# Patient Record
Sex: MALE | Race: WHITE | Employment: OTHER | URBAN - METROPOLITAN AREA
[De-identification: names, ages, dates, MRNs, and addresses within clinical notes are randomized per-mention and may not be internally consistent; named-entity substitution may affect disease eponyms.]

---

## 2018-06-15 ENCOUNTER — OFFICE VISIT (OUTPATIENT)
Dept: URGENT CARE | Facility: RETAIL CLINIC | Age: 48
End: 2018-06-15
Payer: COMMERCIAL

## 2018-06-15 VITALS
DIASTOLIC BLOOD PRESSURE: 59 MMHG | TEMPERATURE: 98.4 F | OXYGEN SATURATION: 97 % | SYSTOLIC BLOOD PRESSURE: 102 MMHG | HEART RATE: 61 BPM

## 2018-06-15 DIAGNOSIS — R09.81 NASAL CONGESTION: ICD-10-CM

## 2018-06-15 DIAGNOSIS — J31.0 RHINITIS, PURULENT, CHRONIC: ICD-10-CM

## 2018-06-15 DIAGNOSIS — Z72.0 TOBACCO ABUSE DISORDER: ICD-10-CM

## 2018-06-15 DIAGNOSIS — R05.9 COUGH: Primary | ICD-10-CM

## 2018-06-15 DIAGNOSIS — R05.8 PRODUCTIVE COUGH: ICD-10-CM

## 2018-06-15 PROCEDURE — 99203 OFFICE O/P NEW LOW 30 MIN: CPT | Performed by: PHYSICIAN ASSISTANT

## 2018-06-15 RX ORDER — AMLODIPINE BESYLATE 5 MG/1
TABLET ORAL
Refills: 0 | COMMUNITY
Start: 2018-06-10

## 2018-06-15 RX ORDER — METOPROLOL SUCCINATE 100 MG/1
TABLET, EXTENDED RELEASE ORAL
Refills: 6 | COMMUNITY
Start: 2018-06-10

## 2018-06-15 RX ORDER — CARVEDILOL 25 MG/1
TABLET ORAL
Refills: 0 | COMMUNITY
Start: 2018-06-10

## 2018-06-15 RX ORDER — LISINOPRIL 40 MG/1
TABLET ORAL
Refills: 1 | COMMUNITY
Start: 2018-06-10

## 2018-06-15 RX ORDER — HYDROCHLOROTHIAZIDE 25 MG/1
TABLET ORAL
Refills: 1 | COMMUNITY
Start: 2018-06-10

## 2018-06-15 RX ORDER — FLUTICASONE PROPIONATE 50 MCG
1-2 SPRAY, SUSPENSION (ML) NASAL DAILY
Qty: 1 BOTTLE | Refills: 1 | Status: SHIPPED | OUTPATIENT
Start: 2018-06-15

## 2018-06-15 RX ORDER — NITROGLYCERIN 0.4 MG/1
TABLET SUBLINGUAL
Refills: 3 | COMMUNITY
Start: 2018-01-15

## 2018-06-15 RX ORDER — FLUOXETINE 10 MG/1
CAPSULE ORAL
Refills: 0 | COMMUNITY
Start: 2018-06-10

## 2018-06-15 RX ORDER — ALBUTEROL SULFATE 90 UG/1
AEROSOL, METERED RESPIRATORY (INHALATION)
Refills: 0 | COMMUNITY
Start: 2018-05-08

## 2018-06-15 RX ORDER — DOXYCYCLINE HYCLATE 100 MG
100 TABLET ORAL 2 TIMES DAILY
Qty: 28 TABLET | Refills: 0 | Status: SHIPPED | OUTPATIENT
Start: 2018-06-15

## 2018-06-15 RX ORDER — ALBUTEROL SULFATE 90 UG/1
2 AEROSOL, METERED RESPIRATORY (INHALATION) EVERY 6 HOURS PRN
Qty: 1 INHALER | Refills: 1 | Status: SHIPPED | OUTPATIENT
Start: 2018-06-15

## 2018-06-15 RX ORDER — ATORVASTATIN CALCIUM 40 MG/1
TABLET, FILM COATED ORAL
Refills: 0 | COMMUNITY
Start: 2018-06-10

## 2018-06-15 NOTE — MR AVS SNAPSHOT
After Visit Summary   6/15/2018    Raymond Sue    MRN: [id number]           Patient Information     Date Of Birth          1970        Visit Information        Provider Department      6/15/2018 12:40 PM Joya Newman PA-C [facility]        Today's Diagnoses     Cough    -  1    Nasal congestion        Tobacco abuse disorder          Care Instructions      Please FOLLOW UP at primary care clinic if not improving, new symptoms, worse or this does not resolve.            Follow-ups after your visit        Who to contact     You can reach your care team any time of the day by calling [phone].  Notification of test results:  If you have an abnormal lab result, we will notify you by phone as soon as possible.         Additional Information About Your Visit        Care EveryWhere ID     This is your Care EveryWhere ID. This could be used by other organizations to access your [place] medical records  [id number]        Your Vitals Were     Pulse Temperature Pulse Oximetry             61 98.4  F (36.9  C) (Temporal) 97%          Blood Pressure from Last 3 Encounters:   06/15/18 102/59    Weight from Last 3 Encounters:   No data found for Wt              Today, you had the following     No orders found for display         Today's Medication Changes          These changes are accurate as of 6/15/18 12:56 PM.  If you have any questions, ask your nurse or doctor.               Start taking these medicines.        Dose/Directions    fluticasone 50 MCG/ACT spray   Commonly known as:  FLONASE   Used for:  Nasal congestion   Started by:  Joya Newman PA-C        Dose:  1-2 spray   Spray 1-2 sprays into both nostrils daily   Quantity:  1 Bottle   Refills:  1         These medicines have changed or have updated prescriptions.        Dose/Directions    * VENTOLIN  (90 Base) MCG/ACT Inhaler   This may have changed:  Another medication with the same name was added.  Make sure you understand how and when to take each.   Generic drug:  albuterol   Changed by:  Joya Newman PA-C        INL 2 PUFFS QID PRN   Refills:  0       * albuterol 108 (90 Base) MCG/ACT Inhaler   Commonly known as:  PROAIR HFA/PROVENTIL HFA/VENTOLIN HFA   This may have changed:  You were already taking a medication with the same name, and this prescription was added. Make sure you understand how and when to take each.   Used for:  Cough   Changed by:  Joya Newman PA-C        Dose:  2 puff   Inhale 2 puffs into the lungs every 6 hours as needed for shortness of breath / dyspnea or wheezing   Quantity:  1 Inhaler   Refills:  1       * Notice:  This list has 2 medication(s) that are the same as other medications prescribed for you. Read the directions carefully, and ask your doctor or other care provider to review them with you.         Where to get your medicines      These medications were sent to [facility] [place] [address] [facility] [address] [facility] [address]  [address] [facility], [facility] [address]     Phone:  [phone]     albuterol 108 (90 Base) MCG/ACT Inhaler    fluticasone 50 MCG/ACT spray                Primary Care Provider Fax #    Physician No Ref-Primary [phone]       No address on file        Equal Access to Services     JENA SR : Hadii aad ku hadasho Soshreyaali, waaxda luqadaha, qaybta kaalmada adeannyaamaris, xiomara anderson. So [facility] [phone].    ATENCIÓN: Si habla español, tiene a arteaga disposición servicios gratuitos de asistencia lingüística. Llame al [phone].    We comply with applicable federal civil rights laws and [place] laws. We do not discriminate on the basis of race, color, national origin, age, disability, sex, sexual orientation, or gender identity.            Thank you!     Thank you for choosing [facility]  for your care. Our goal is always to provide you with excellent care.  Hearing back from our patients is one way we can continue to improve our services. Please take a few minutes to complete the written survey that you may receive in the mail after your visit with us. Thank you!             Your Updated Medication List - Protect others around you: Learn how to safely use, store and throw away your medicines at www.disposemymeds.org.          This list is accurate as of 6/15/18 12:56 PM.  Always use your most recent med list.                   Brand Name Dispense Instructions for use Diagnosis    amLODIPine 5 MG tablet    NORVASC     TK 1 T PO QD        atorvastatin 40 MG tablet    LIPITOR     TK 1 T PO QD IN THE YISEL        carvedilol 25 MG tablet    COREG     TK 1 T PO BID WC        FLUoxetine 10 MG capsule    PROzac     TK 1 C PO QAM        fluticasone 50 MCG/ACT spray    FLONASE    1 Bottle    Spray 1-2 sprays into both nostrils daily    Nasal congestion       hydrochlorothiazide 25 MG tablet    HYDRODIURIL     TK 1 T PO QD        lisinopril 40 MG tablet    PRINIVIL/ZESTRIL     TK 1 T PO QD        metoprolol succinate 100 MG 24 hr tablet    TOPROL-XL     TK ONE T PO QD        nitroGLYcerin 0.4 MG sublingual tablet    NITROSTAT     PLACE 1 T UNDER THE TONGUE Q 5 MINUTES IF NEEDED FOR CP        * VENTOLIN  (90 Base) MCG/ACT Inhaler   Generic drug:  albuterol      INL 2 PUFFS QID PRN        * albuterol 108 (90 Base) MCG/ACT Inhaler    PROAIR HFA/PROVENTIL HFA/VENTOLIN HFA    1 Inhaler    Inhale 2 puffs into the lungs every 6 hours as needed for shortness of breath / dyspnea or wheezing    Cough       * Notice:  This list has 2 medication(s) that are the same as other medications prescribed for you. Read the directions carefully, and ask your doctor or other care provider to review them with you.

## 2018-06-15 NOTE — PROGRESS NOTES
Chief Complaint   Patient presents with     Cough     productive cough x 2 days      Nasal Congestion     nasal congstion x 2 days      Generalized Body Aches     bodyaches x 2 days      Fatigue     feeling rundown and tired x 2 days         SUBJECTIVE:   Pt. presenting to [facility] -  with a chief complaint of suddne onset URI and cough syumptoms x 2 days. Possible low grade temp this am. OTC allergy med helped nasal congestion this am. Tired..   See CC.  Cough occ productive. Gets in coughing jags at times. Has not used Albuterol inhaler yet..No SOB or chest pain.   Hx of asthma n.  Onset of symptoms gradual  Course of illness is same.    Severity moderate  Current and Associated symptoms: feverish, runny nose, stuffy nose, cough - non-productive, cough - productive and fatigue  Treatment measures tried include OTC Cough med.  Predisposing factors include tobacco use.  Last antibiotic - 2 months  Z pack -  'flu'     Smoker yes    ROS:  Energy level is a nlittle <  ENT - denies ear pain, throat pain  CP - see above  GI- - appetite ok. No nausea, vomiting or diarrhea.   No bowel or bladder changes   MSK - no joint pain or swelling   Skin: No rashes    No past medical history on file.  No past surgical history on file.  There is no problem list on file for this patient.    Current Outpatient Prescriptions   Medication     amLODIPine (NORVASC) 5 MG tablet     atorvastatin (LIPITOR) 40 MG tablet     carvedilol (COREG) 25 MG tablet     FLUoxetine (PROZAC) 10 MG capsule     hydrochlorothiazide (HYDRODIURIL) 25 MG tablet     lisinopril (PRINIVIL/ZESTRIL) 40 MG tablet     metoprolol succinate (TOPROL-XL) 100 MG 24 hr tablet     nitroGLYcerin (NITROSTAT) 0.4 MG sublingual tablet     VENTOLIN  (90 Base) MCG/ACT Inhaler     No current facility-administered medications for this visit.        OBJECTIVE:  /59 (BP Location: Right arm, Patient Position: Chair, Cuff Size: Adult Large)  Pulse 61   "Temp 98.4  F (36.9  C) (Temporal)  SpO2 97%    GENERAL APPEARANCE: cooperative, alert and no distress. Appears well hydrated.  EYES: conjunctiva clear  HENT: Rt ear canal  Some soft ceruem and TM normal   Lt ear canal clear and TM normal   Nose mod congestion. Clear  discharge  Mouth without ulcers or lesions. no erythema. no exudate.   NECK: supple, few small shoddy NT ant nodes. No  posterior nodes.  RESP: - no rales or wheezes. Few scattered ronchi - clear with cough. Frequent dry coughBreathing easily.  CV: regular rates and rhythm  ABDOMEN:  soft, nontender, no HSM or masses and bowel sounds normal   SKIN: no suspicious lesions or rashes  no tenderness to palpate over  sinus areas.      ASSESSMENT:     Cough  Nasal congestion  Tobacco abuse disorder  Productive cough  Rhinitis, purulent, chronic      PLAN:  Symptomatic measures   Prescriptions as below. Discussed indications, dosing, side affects and adverse reactions of medications with  Patient -Flonase and Albuterol - fill Rx for Doxy if > 7-10 dfyas, 'worse', fever. Discussed \"watchful waiting\"  Eat yogurt daily or take a probiotic supplement when on antibiotics.  OTC cough suppressant/expectorant discussed  saline nasal spray for  nasal congestion   Cool mist vaporizer.  Smoking discouraged - Discussed > CV,CP and cancer risks with tobacco use.   Stay in clean air environment.  > rest.  > fluids.  Contagiousness and hygiene discussed.  Fever and pain  control measures discussed.   If unable to swallow or any breathing difficulty to go to ED   AVS given and discussed:  Patient Instructions     Please FOLLOW UP at primary care clinic if not improving, new symptoms, worse or this does not resolve.      Discussed CARE EVERYWHERE  Pt is comfortable with this plan.  Electronically signed,  TABITHA Clay      "

## 2018-08-04 ENCOUNTER — APPOINTMENT (OUTPATIENT)
Dept: GENERAL RADIOLOGY | Facility: CLINIC | Age: 48
End: 2018-08-04
Attending: PHYSICIAN ASSISTANT
Payer: COMMERCIAL

## 2018-08-04 ENCOUNTER — HOSPITAL ENCOUNTER (EMERGENCY)
Facility: CLINIC | Age: 48
Discharge: HOME | End: 2018-08-04
Attending: PHYSICIAN ASSISTANT | Admitting: PHYSICIAN ASSISTANT
Payer: COMMERCIAL

## 2018-08-04 VITALS
DIASTOLIC BLOOD PRESSURE: 90 MMHG | WEIGHT: 260 LBS | RESPIRATION RATE: 16 BRPM | SYSTOLIC BLOOD PRESSURE: 154 MMHG | TEMPERATURE: 98.3 F | OXYGEN SATURATION: 98 % | HEART RATE: 72 BPM

## 2018-08-04 DIAGNOSIS — E87.6 HYPOKALEMIA: ICD-10-CM

## 2018-08-04 DIAGNOSIS — R07.89 ATYPICAL CHEST PAIN: ICD-10-CM

## 2018-08-04 LAB
ALBUMIN SERPL-MCNC: 3.5 G/DL (ref 3.4–5)
ALP SERPL-CCNC: 80 U/L (ref 40–150)
ALT SERPL W P-5'-P-CCNC: 27 U/L (ref 0–70)
ANION GAP SERPL CALCULATED.3IONS-SCNC: 8 MMOL/L (ref 3–14)
AST SERPL W P-5'-P-CCNC: 13 U/L (ref 0–45)
BASOPHILS # BLD AUTO: 0.1 10E9/L (ref 0–0.2)
BASOPHILS NFR BLD AUTO: 0.6 %
BILIRUB SERPL-MCNC: 0.3 MG/DL (ref 0.2–1.3)
BUN SERPL-MCNC: 12 MG/DL (ref 7–30)
CALCIUM SERPL-MCNC: 8.2 MG/DL (ref 8.5–10.1)
CHLORIDE SERPL-SCNC: 109 MMOL/L (ref 94–109)
CO2 SERPL-SCNC: 27 MMOL/L (ref 20–32)
CREAT SERPL-MCNC: 0.96 MG/DL (ref 0.66–1.25)
D DIMER PPP FEU-MCNC: 0.3 UG/ML FEU (ref 0–0.5)
DEPRECATED S PYO AG THROAT QL EIA: NORMAL
DIFFERENTIAL METHOD BLD: NORMAL
EOSINOPHIL NFR BLD AUTO: 3.3 %
ERYTHROCYTE [DISTWIDTH] IN BLOOD BY AUTOMATED COUNT: 12.1 % (ref 10–15)
GFR SERPL CREATININE-BSD FRML MDRD: 84 ML/MIN/1.7M2
GLUCOSE SERPL-MCNC: 122 MG/DL (ref 70–99)
HCT VFR BLD AUTO: 41.7 % (ref 40–53)
HGB BLD-MCNC: 14.1 G/DL (ref 13.3–17.7)
IMM GRANULOCYTES # BLD: 0 10E9/L (ref 0–0.4)
IMM GRANULOCYTES NFR BLD: 0.2 %
LIPASE SERPL-CCNC: 158 U/L (ref 73–393)
LYMPHOCYTES # BLD AUTO: 2.1 10E9/L (ref 0.8–5.3)
LYMPHOCYTES NFR BLD AUTO: 23.8 %
MCH RBC QN AUTO: 30.9 PG (ref 26.5–33)
MCHC RBC AUTO-ENTMCNC: 33.8 G/DL (ref 31.5–36.5)
MCV RBC AUTO: 91 FL (ref 78–100)
MONOCYTES # BLD AUTO: 0.7 10E9/L (ref 0–1.3)
MONOCYTES NFR BLD AUTO: 7.6 %
NEUTROPHILS # BLD AUTO: 5.6 10E9/L (ref 1.6–8.3)
NEUTROPHILS NFR BLD AUTO: 64.5 %
NRBC # BLD AUTO: 0 10*3/UL
NRBC BLD AUTO-RTO: 0 /100
NT-PROBNP SERPL-MCNC: 343 PG/ML (ref 0–450)
PLATELET # BLD AUTO: 258 10E9/L (ref 150–450)
POTASSIUM SERPL-SCNC: 3.2 MMOL/L (ref 3.4–5.3)
PROT SERPL-MCNC: 6.6 G/DL (ref 6.8–8.8)
RBC # BLD AUTO: 4.57 10E12/L (ref 4.4–5.9)
SODIUM SERPL-SCNC: 144 MMOL/L (ref 133–144)
SPECIMEN SOURCE: NORMAL
TROPONIN I SERPL-MCNC: <0.015 UG/L (ref 0–0.04)
WBC # BLD AUTO: 8.7 10E9/L (ref 4–11)

## 2018-08-04 PROCEDURE — 25000125 ZZHC RX 250: Performed by: PHYSICIAN ASSISTANT

## 2018-08-04 PROCEDURE — 36415 COLL VENOUS BLD VENIPUNCTURE: CPT | Performed by: PHYSICIAN ASSISTANT

## 2018-08-04 PROCEDURE — 83690 ASSAY OF LIPASE: CPT | Performed by: PHYSICIAN ASSISTANT

## 2018-08-04 PROCEDURE — 93010 ELECTROCARDIOGRAM REPORT: CPT | Mod: Z6 | Performed by: PHYSICIAN ASSISTANT

## 2018-08-04 PROCEDURE — 85025 COMPLETE CBC W/AUTO DIFF WBC: CPT | Performed by: PHYSICIAN ASSISTANT

## 2018-08-04 PROCEDURE — 25000132 ZZH RX MED GY IP 250 OP 250 PS 637: Performed by: PHYSICIAN ASSISTANT

## 2018-08-04 PROCEDURE — 85379 FIBRIN DEGRADATION QUANT: CPT | Performed by: PHYSICIAN ASSISTANT

## 2018-08-04 PROCEDURE — 71046 X-RAY EXAM CHEST 2 VIEWS: CPT | Mod: TC

## 2018-08-04 PROCEDURE — 80053 COMPREHEN METABOLIC PANEL: CPT | Performed by: PHYSICIAN ASSISTANT

## 2018-08-04 PROCEDURE — 87081 CULTURE SCREEN ONLY: CPT | Performed by: PHYSICIAN ASSISTANT

## 2018-08-04 PROCEDURE — 87880 STREP A ASSAY W/OPTIC: CPT | Performed by: PHYSICIAN ASSISTANT

## 2018-08-04 PROCEDURE — 83880 ASSAY OF NATRIURETIC PEPTIDE: CPT | Performed by: PHYSICIAN ASSISTANT

## 2018-08-04 PROCEDURE — 99285 EMERGENCY DEPT VISIT HI MDM: CPT | Mod: 25 | Performed by: PHYSICIAN ASSISTANT

## 2018-08-04 PROCEDURE — 93005 ELECTROCARDIOGRAM TRACING: CPT | Performed by: PHYSICIAN ASSISTANT

## 2018-08-04 PROCEDURE — 84484 ASSAY OF TROPONIN QUANT: CPT | Performed by: PHYSICIAN ASSISTANT

## 2018-08-04 RX ORDER — POTASSIUM CHLORIDE 1500 MG/1
20 TABLET, EXTENDED RELEASE ORAL ONCE
Status: COMPLETED | OUTPATIENT
Start: 2018-08-04 | End: 2018-08-04

## 2018-08-04 RX ADMIN — LIDOCAINE HYDROCHLORIDE 30 ML: 20 SOLUTION ORAL; TOPICAL at 22:33

## 2018-08-04 RX ADMIN — POTASSIUM CHLORIDE 20 MEQ: 1500 TABLET, EXTENDED RELEASE ORAL at 23:45

## 2018-08-04 NOTE — ED AVS SNAPSHOT
[facility] Emergency Department    [address] [place] [address]    Phone:  [phone]    Fax:  [phone]                                       Raymond Sue   MRN: [id number]    Department:  [facility] Emergency Department   Date of Visit:  8/4/2018           Patient Information     Date Of Birth          1970        Your diagnoses for this visit were:     Atypical chest pain     Hypokalemia        You were seen by Maximilian Lee PA-C.      Follow-up Information     Follow up with [facility] Emergency Department.    Specialty:  EMERGENCY MEDICINE    Why:  As needed, If symptoms worsen    Contact information:    [address]   [place] [address] [phone]    Additional information:    From [address]: Exit at [address] on south side of [place]. Turn right on [address]. Turn left at stoplight on [address]. [facility] will be in view two blocks ahead        Discharge Instructions       It was a pleasure working with you today!  I hope your condition improves rapidly!     Thankfully, all of your testing turned out okay today, other than a mildly low potassium level.  We gave you a potassium pill here in the ED.  Please see your primary care provider in 4 days for a recheck, and he will recheck your levels at that time to see if you will need long term replacement of the potassium.      I am concerned that your chest discomfort is coming from the muscles of the chest.  Please try taking Ibuprofen 600 mg every 6 hours as needed for pain to see if this makes a difference.  Have this with food.          24 Hour Appointment Hotline       To make an appointment at any [facility], call [phone] ([phone]). If you don't have a family doctor or clinic, we will help you find one. [place] clinics are conveniently located to serve the needs of you and your family.             Review of your medicines      Our  records show that you are taking the medicines listed below. If these are incorrect, please call your family doctor or clinic.        Dose / Directions Last dose taken    amLODIPine 5 MG tablet   Commonly known as:  NORVASC        TK 1 T PO QD   Refills:  0        atorvastatin 40 MG tablet   Commonly known as:  LIPITOR        TK 1 T PO QD IN THE YISEL   Refills:  0        carvedilol 25 MG tablet   Commonly known as:  COREG        TK 1 T PO BID WC   Refills:  0        doxycycline 100 MG tablet   Commonly known as:  VIBRA-TABS   Dose:  100 mg   Quantity:  28 tablet        Take 1 tablet (100 mg) by mouth 2 times daily   Refills:  0        FLUoxetine 10 MG capsule   Commonly known as:  PROzac        TK 1 C PO QAM   Refills:  0        fluticasone 50 MCG/ACT spray   Commonly known as:  FLONASE   Dose:  1-2 spray   Quantity:  1 Bottle        Spray 1-2 sprays into both nostrils daily   Refills:  1        hydrochlorothiazide 25 MG tablet   Commonly known as:  HYDRODIURIL        TK 1 T PO QD   Refills:  1        lisinopril 40 MG tablet   Commonly known as:  PRINIVIL/ZESTRIL        TK 1 T PO QD   Refills:  1        metoprolol succinate 100 MG 24 hr tablet   Commonly known as:  TOPROL-XL        TK ONE T PO QD   Refills:  6        nitroGLYcerin 0.4 MG sublingual tablet   Commonly known as:  NITROSTAT        PLACE 1 T UNDER THE TONGUE Q 5 MINUTES IF NEEDED FOR CP   Refills:  3        * VENTOLIN  (90 Base) MCG/ACT Inhaler   Generic drug:  albuterol        INL 2 PUFFS QID PRN   Refills:  0        * albuterol 108 (90 Base) MCG/ACT Inhaler   Commonly known as:  PROAIR HFA/PROVENTIL HFA/VENTOLIN HFA   Dose:  2 puff   Quantity:  1 Inhaler        Inhale 2 puffs into the lungs every 6 hours as needed for shortness of breath / dyspnea or wheezing   Refills:  1        * Notice:  This list has 2 medication(s) that are the same as other medications prescribed for you. Read the directions carefully, and ask your doctor or other care  provider to review them with you.            Procedures and tests performed during your visit     Beta strep group A culture    CBC with platelets differential    Comprehensive metabolic panel    D dimer quantitative    EKG 12-lead, tracing only    Lipase    Nt probnp inpatient (BNP)    Peripheral IV catheter    Rapid strep screen    Troponin I    XR Chest 2 Views      Orders Needing Specimen Collection     None      Pending Results     Date and Time Order Name Status Description    8/4/2018 2232 Beta strep group A culture In process             Pending Culture Results     Date and Time Order Name Status Description    8/4/2018 2232 Beta strep group A culture In process             Pending Results Instructions     If you had any lab results that were not finalized at the time of your Discharge, you can call the ED Lab Result RN at [phone]. You will be contacted by this team for any positive Lab results or changes in treatment. The nurses are available 7 days a week from 10A to 6:30P.  You can leave a message 24 hours per day and they will return your call.        Thank you for choosing [facility]       Thank you for choosing [facility] for your care. Our goal is always to provide you with excellent care. Hearing back from our patients is one way we can continue to improve our services. Please take a few minutes to complete the written survey that you may receive in the mail after you visit with us. Thank you!        Care EveryWhere ID     This is your Care EveryWhere ID. This could be used by other organizations to access your [facility] medical records  [id number]        Equal Access to Services     JENA SR : Daniel Gottlieb, [place] nicole, qaybta kaalxiomara heller. So [facility] [phone].    ATENCIÓN: Si habla español, tiene a arteaga disposición servicios gratuitos de asistencia lingüística. Llame al [phone].    We comply with applicable federal  civil rights laws and [place] laws. We do not discriminate on the basis of race, color, national origin, age, disability, sex, sexual orientation, or gender identity.            After Visit Summary       This is your record. Keep this with you and show to your community pharmacist(s) and doctor(s) at your next visit.

## 2018-08-04 NOTE — ED AVS SNAPSHOT
[facility] Emergency Department    [address] [place] [address]    Phone:  [phone]    Fax:  [phone]                                       Raymond Sue   MRN: [id number]    Department:  [facility] Emergency Department   Date of Visit:  8/4/2018           After Visit Summary Signature Page     I have received my discharge instructions, and my questions have been answered. I have discussed any challenges I see with this plan with the nurse or doctor.    ..........................................................................................................................................  Patient/Patient Representative Signature      ..........................................................................................................................................  Patient Representative Print Name and Relationship to Patient    ..................................................               ................................................  Date                                            Time    ..........................................................................................................................................  Reviewed by Signature/Title    ...................................................              ..............................................  Date                                                            Time

## 2018-08-05 NOTE — DISCHARGE INSTRUCTIONS
It was a pleasure working with you today!  I hope your condition improves rapidly!     Thankfully, all of your testing turned out okay today, other than a mildly low potassium level.  We gave you a potassium pill here in the ED.  Please see your primary care provider in 4 days for a recheck, and he will recheck your levels at that time to see if you will need long term replacement of the potassium.      I am concerned that your chest discomfort is coming from the muscles of the chest.  Please try taking Ibuprofen 600 mg every 6 hours as needed for pain to see if this makes a difference.  Have this with food.

## 2018-08-05 NOTE — ED TRIAGE NOTES
"Patient with upper bilateral chest pain, back/shoulder pain which is constant and describes as a \"sunburn\" type feeling, starting on Tuesday AM. Also with sore throat.   "

## 2018-08-05 NOTE — ED PROVIDER NOTES
"  History     Chief Complaint   Patient presents with     Pharyngitis     Chest Wall Pain     Back Pain     HPI  Raymond Sue is a 47 year old male who presents for evaluation of 5 days of burning discomfort in the left chest wall with radiation around in between the bilateral scapular area.  Worse this morning.  He states that he slept all day.  He states that it feels \"like a really bad sunburn\".  Denies any skin rashes that he has noticed.  He has not had any prolonged sunlight exposure.  Also started with a sore throat today.  Denies any difficulty swallowing, acid brash, fevers, chills, or other URI symptoms.  Had a couple episodes of bilateral hand cramping 2 hours prior to arrival, but this has since resolved.  Denies any increased symptoms with exertion.  Denies any dyspnea, cough, or injury to the chest wall.  No lower extremity edema.  Patient has not tried anything to treat his symptoms to this point.  Reports of pain being 3 on a scale of 10.  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  He has had a 4 year hiatus from work due to his previous diagnosis and treatment for congestive heart failure.  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  States that he has been sober ever since his diagnosis.  Smokes 1 pack per day.  No family history of coronary artery disease.    Problem List:    Patient Active Problem List    Diagnosis Date Noted     Tobacco abuse disorder 06/15/2018     Priority: Medium        Past Medical History:    History reviewed. No pertinent past medical history.    Past Surgical History:    History reviewed. No pertinent surgical history.    Family History:    No family history on file.    Social History:  Marital Status:   [2]  Social History   Substance Use Topics     Smoking status: Current Every Day Smoker     Packs/day: 1.00     " Smokeless tobacco: Never Used     Alcohol use No        Medications:      albuterol (PROAIR HFA/PROVENTIL HFA/VENTOLIN HFA) 108 (90 Base) MCG/ACT Inhaler   amLODIPine (NORVASC) 5 MG tablet   atorvastatin (LIPITOR) 40 MG tablet   carvedilol (COREG) 25 MG tablet   doxycycline (VIBRA-TABS) 100 MG tablet   FLUoxetine (PROZAC) 10 MG capsule   fluticasone (FLONASE) 50 MCG/ACT spray   hydrochlorothiazide (HYDRODIURIL) 25 MG tablet   lisinopril (PRINIVIL/ZESTRIL) 40 MG tablet   metoprolol succinate (TOPROL-XL) 100 MG 24 hr tablet   nitroGLYcerin (NITROSTAT) 0.4 MG sublingual tablet   VENTOLIN  (90 Base) MCG/ACT Inhaler         Review of Systems   All other systems reviewed and are negative.      Physical Exam   BP: 154/90  Pulse: 72  Heart Rate: 72  Temp: 98.3  F (36.8  C)  Resp: 16  Weight: 117.9 kg (260 lb)  SpO2: 98 %      Physical Exam   Constitutional: He is oriented to person, place, and time. No distress.   Obese   HENT:   Head: Normocephalic and atraumatic.   Right Ear: External ear normal.   Left Ear: External ear normal.   Nose: Nose normal.   Mouth/Throat: Oropharynx is clear and moist. No oropharyngeal exudate.   Eyes: Conjunctivae and EOM are normal. Pupils are equal, round, and reactive to light. Right eye exhibits no discharge. Left eye exhibits no discharge. No scleral icterus.   Neck: Normal range of motion. Neck supple. No thyromegaly present.   Cardiovascular: Normal rate, regular rhythm, normal heart sounds and intact distal pulses.    No murmur heard.  Pulmonary/Chest: Effort normal and breath sounds normal. No respiratory distress. He has no wheezes. He has no rales. He exhibits tenderness (Pain with AP and lateral compression of the chest wall.).   Abdominal: Soft. Bowel sounds are normal. He exhibits no distension and no mass. There is no tenderness. There is no rebound and no guarding.   Musculoskeletal: Normal range of motion. He exhibits no edema or tenderness.   No calf swelling.  No  palpable cord.  Negative Homans sign.   Lymphadenopathy:     He has no cervical adenopathy.   Neurological: He is alert and oriented to person, place, and time. He has normal reflexes. No cranial nerve deficit.   Skin: Skin is warm and dry. No rash noted. He is not diaphoretic. No erythema. No pallor.   Psychiatric: He has a normal mood and affect. His behavior is normal. Judgment and thought content normal.   Nursing note and vitals reviewed.      ED Course     ED Course     Procedures               EKG Interpretation:      Interpreted by Maximilian Lee  Time reviewed: 2240  Symptoms at time of EKG: chest discomfort.   Rhythm: normal sinus   Rate: normal  Axis: normal  Ectopy: none  Conduction: normal  ST Segments/ T Waves: No ST-T wave changes  Q Waves: none  Comparison to prior: Unchanged    Clinical Impression: normal EKG          Critical Care time:  none               Results for orders placed or performed during the hospital encounter of 08/04/18 (from the past 24 hour(s))   Rapid strep screen   Result Value Ref Range    Specimen Description Throat     Rapid Strep A Screen       NEGATIVE: No Group A streptococcal antigen detected by immunoassay, await culture report.   CBC with platelets differential   Result Value Ref Range    WBC 8.7 4.0 - 11.0 10e9/L    RBC Count 4.57 4.4 - 5.9 10e12/L    Hemoglobin 14.1 13.3 - 17.7 g/dL    Hematocrit 41.7 40.0 - 53.0 %    MCV 91 78 - 100 fl    MCH 30.9 26.5 - 33.0 pg    MCHC 33.8 31.5 - 36.5 g/dL    RDW 12.1 10.0 - 15.0 %    Platelet Count 258 150 - 450 10e9/L    Diff Method Automated Method     % Neutrophils 64.5 %    % Lymphocytes 23.8 %    % Monocytes 7.6 %    % Eosinophils 3.3 %    % Basophils 0.6 %    % Immature Granulocytes 0.2 %    Nucleated RBCs 0 0 /100    Absolute Neutrophil 5.6 1.6 - 8.3 10e9/L    Absolute Lymphocytes 2.1 0.8 - 5.3 10e9/L    Absolute Monocytes 0.7 0.0 - 1.3 10e9/L    Absolute Basophils 0.1 0.0 - 0.2 10e9/L    Abs Immature Granulocytes 0.0  0 - 0.4 10e9/L    Absolute Nucleated RBC 0.0    Comprehensive metabolic panel   Result Value Ref Range    Sodium 144 133 - 144 mmol/L    Potassium 3.2 (L) 3.4 - 5.3 mmol/L    Chloride 109 94 - 109 mmol/L    Carbon Dioxide 27 20 - 32 mmol/L    Anion Gap 8 3 - 14 mmol/L    Glucose 122 (H) 70 - 99 mg/dL    Urea Nitrogen 12 7 - 30 mg/dL    Creatinine 0.96 0.66 - 1.25 mg/dL    GFR Estimate 84 >60 mL/min/1.7m2    GFR Estimate If Black >90 >60 mL/min/1.7m2    Calcium 8.2 (L) 8.5 - 10.1 mg/dL    Bilirubin Total 0.3 0.2 - 1.3 mg/dL    Albumin 3.5 3.4 - 5.0 g/dL    Protein Total 6.6 (L) 6.8 - 8.8 g/dL    Alkaline Phosphatase 80 40 - 150 U/L    ALT 27 0 - 70 U/L    AST 13 0 - 45 U/L   D dimer quantitative   Result Value Ref Range    D Dimer 0.3 0.0 - 0.50 ug/ml FEU   Lipase   Result Value Ref Range    Lipase 158 73 - 393 U/L   Nt probnp inpatient (BNP)   Result Value Ref Range    N-Terminal Pro BNP Inpatient 343 0 - 450 pg/mL   Troponin I   Result Value Ref Range    Troponin I ES <0.015 0.000 - 0.045 ug/L   XR Chest 2 Views    Narrative    CHEST TWO VIEWS    8/4/2018 10:43 PM     HISTORY: Chest pain.     COMPARISON: None.    FINDINGS:  Minimal linear atelectasis versus scarring is seen in the  left lateral lung base. Lungs are clear. No pleural effusions or  pneumothorax. Heart size and pulmonary vascularity are within normal  limits. No acute fracture.      Impression    IMPRESSION: Minimal linear atelectasis versus scarring is seen in the  left lateral lung base. No other evidence of acute cardiopulmonary  disease is seen.       MAXI PEREZ MD       Medications   lidocaine (viscous) (XYLOCAINE) 2 % 15 mL, alum & mag hydroxide-simethicone (MYLANTA ES/MAALOX  ES) 15 mL GI Cocktail (30 mLs Oral Given 8/4/18 4287)   potassium chloride SA (K-DUR/KLOR-CON M) CR tablet 20 mEq (20 mEq Oral Given 8/4/18 8003)       Assessments & Plan (with Medical Decision Making)     Atypical chest pain  Hypokalemia     47 year old male with a  "history of methamphetamine induced CHF stabilized on medication therapy who presents for evaluation of consistent 5 days of left-sided chest discomfort rating around the posterior aspect of the chest.  He states that it \"feels like a really bad sunburn\".  Describes it as a burning discomfort.  No associated respiratory symptoms.  Some sore throat starting today.  He has not trialed anything to treat his symptoms.  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  On exam blood pressure 154/90, pulse 72, temperature 98.3, and oxygen saturation 98% on room air.  Patient appears in no acute distress.  He has tenderness of the chest wall with PA and lateral compression of the chest.  Exam is otherwise normal as noted above.  GI cocktail was given without improvement in his symptoms.  Chest x-ray performed and did not show any acute abnormalities.  EKG did not show any acute ST or T-wave changes.  No Q waves.  No arrhythmia.  Labs obtained shows potassium mildly low at 3.2.  This was replaced with 20 mEq of K-Dur.  Glucose elevated 222, but this is a nonfasting level.  Remainder of the CMP and CBC completely normal.  D-dimer normal at 0.3.  Lipase, BNP, and troponin normal.  Given that he has had ongoing symptoms for the past 5 days, I would expect the troponin to increase if he had significant cardiac ischemia.  He has a normal EKG at this time as well.  Reproducible chest discomfort with AP and lateral compression of the chest wall.  No improvement with the GI cocktail.  These findings are most consistent with chest wall discomfort.  Will trial OTC anti-inflammatory treatment in the form of ibuprofen 800 mg every 8 hours as needed for pain to be taken with food.  I was not able to give him an IV dose of Toradol here in the ED, as his IV was discontinued when I went to discuss his results with him.  Push clear fluids.  He will call his PCP and line up a follow-up visit " in 4 days for reevaluation.  He will need his potassium checked at that time to see if he needs more long-term replacement.  Patient states that he has multiple food allergies to fresh fruits, and likely is not getting enough oral potassium in his diet.  He is currently on diuretic therapy.  Encouraged to return to the ED prior to then if symptoms change or worsen.  The patient was in agreement with this plan and was suitable for discharge.     I have reviewed the nursing notes.    I have reviewed the findings, diagnosis, plan and need for follow up with the patient.       Discharge Medication List as of 8/4/2018 11:41 PM          Final diagnoses:   Atypical chest pain   Hypokalemia       Disclaimer: This note consists of symbols derived from keyboarding, dictation and/or voice recognition software. As a result, there may be errors in the script that have gone undetected. Please consider this when interpreting information found in this chart.    8/4/2018   Maximilian Lee PA-C   [facility] EMERGENCY DEPARTMENT     Maximilian Lee PA-C  08/05/18 0005

## 2018-08-06 LAB
BACTERIA SPEC CULT: NORMAL
SPECIMEN SOURCE: NORMAL

## 2019-09-30 ENCOUNTER — HOSPITAL ENCOUNTER (EMERGENCY)
Facility: CLINIC | Age: 49
Discharge: HOME | End: 2019-09-30
Attending: EMERGENCY MEDICINE | Admitting: EMERGENCY MEDICINE
Payer: COMMERCIAL

## 2019-09-30 ENCOUNTER — APPOINTMENT (OUTPATIENT)
Dept: GENERAL RADIOLOGY | Facility: CLINIC | Age: 49
End: 2019-09-30
Attending: EMERGENCY MEDICINE
Payer: COMMERCIAL

## 2019-09-30 VITALS
SYSTOLIC BLOOD PRESSURE: 146 MMHG | DIASTOLIC BLOOD PRESSURE: 89 MMHG | OXYGEN SATURATION: 99 % | HEART RATE: 68 BPM | TEMPERATURE: 98.4 F | WEIGHT: 260 LBS | RESPIRATION RATE: 16 BRPM
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
## 2019-09-30 DIAGNOSIS — S46.912A STRAIN OF LEFT SHOULDER, INITIAL ENCOUNTER: ICD-10-CM

## 2019-09-30 PROCEDURE — 99283 EMERGENCY DEPT VISIT LOW MDM: CPT | Mod: Z6 | Performed by: EMERGENCY MEDICINE

## 2019-09-30 PROCEDURE — 73030 X-RAY EXAM OF SHOULDER: CPT | Mod: TC,LT

## 2019-09-30 PROCEDURE — 73000 X-RAY EXAM OF COLLAR BONE: CPT | Mod: TC,LT

## 2019-09-30 PROCEDURE — 99284 EMERGENCY DEPT VISIT MOD MDM: CPT | Performed by: EMERGENCY MEDICINE

## 2019-09-30 NOTE — ED PROVIDER NOTES
"  History     Chief Complaint   Patient presents with     Shoulder Injury     The history is provided by the patient.     Raymond Sue is a 48 year old male who presents to the emergency department for a shoulder injury. Patient reports falling from his deer stand yesterday landing on his left shoulder. Her reports \"landing on the earth\". He states it is painful to lift his left shoulder/arm over his head. He denies hitting his head. Patient denies any other injuries. He states he took 1200 mg of Ibuprofen today around 1100.    Allergies:  No Known Allergies    Problem List:    Patient Active Problem List    Diagnosis Date Noted     Tobacco abuse disorder 06/15/2018     Priority: Medium        Past Medical History:    No past medical history on file.    Past Surgical History:    No past surgical history on file.    Family History:    No family history on file.    Social History:  Marital Status:   [2]  Social History     Tobacco Use     Smoking status: Current Every Day Smoker     Packs/day: 1.00     Smokeless tobacco: Never Used   Substance Use Topics     Alcohol use: No     Drug use: No        Medications:    albuterol (PROAIR HFA/PROVENTIL HFA/VENTOLIN HFA) 108 (90 Base) MCG/ACT Inhaler  amLODIPine (NORVASC) 5 MG tablet  atorvastatin (LIPITOR) 40 MG tablet  carvedilol (COREG) 25 MG tablet  doxycycline (VIBRA-TABS) 100 MG tablet  FLUoxetine (PROZAC) 10 MG capsule  fluticasone (FLONASE) 50 MCG/ACT spray  hydrochlorothiazide (HYDRODIURIL) 25 MG tablet  lisinopril (PRINIVIL/ZESTRIL) 40 MG tablet  metoprolol succinate (TOPROL-XL) 100 MG 24 hr tablet  nitroGLYcerin (NITROSTAT) 0.4 MG sublingual tablet  VENTOLIN  (90 Base) MCG/ACT Inhaler          Review of Systems   All other systems reviewed and are negative.      Physical Exam   BP: (!) 158/102  Pulse: 83  Temp: 98.4  F (36.9  C)  Resp: 14  Weight: 117.9 kg (260 lb)  SpO2: 97 %      Physical Exam  Vitals signs and nursing note reviewed. "   Constitutional:       General: He is not in acute distress.     Appearance: Normal appearance. He is not diaphoretic.   HENT:      Head: Normocephalic and atraumatic.      Mouth/Throat:      Mouth: Mucous membranes are moist.   Eyes:      General: No scleral icterus.     Pupils: Pupils are equal, round, and reactive to light.   Neck:      Musculoskeletal: Normal range of motion.   Cardiovascular:      Rate and Rhythm: Normal rate and regular rhythm.      Heart sounds: Normal heart sounds.   Pulmonary:      Effort: Pulmonary effort is normal. No respiratory distress.      Breath sounds: Normal breath sounds.   Chest:      Chest wall: No tenderness.   Abdominal:      General: Abdomen is flat. Bowel sounds are normal.      Palpations: Abdomen is soft.      Tenderness: There is no tenderness.   Musculoskeletal:      Cervical back: He exhibits no tenderness.        Thoracic back: He exhibits no tenderness.      Lumbar back: He exhibits no tenderness.      Comments: Soft tissue swelling to superior left clavicle. Left clavicle has no crepitus or step off.  Shoulder has near normal range of motion but is painful with arm above horizontal position.  CMS to the arm intact.  There is no deformity to the shoulder, elbow and wrist.   Skin:     General: Skin is warm.      Findings: No rash.   Neurological:      General: No focal deficit present.      Mental Status: He is alert and oriented to person, place, and time.   Psychiatric:         Mood and Affect: Mood normal.         Thought Content: Thought content normal.         Judgement: Judgment normal.         ED Course        Procedures               Critical Care time:  none               Results for orders placed or performed during the hospital encounter of 09/30/19 (from the past 24 hour(s))   XR Shoulder Left 3 Views    Narrative    LEFT SHOULDER THREE OR MORE VIEWS, LEFT CLAVICLE TWO VIEWS 9/30/2019  12:49 PM     HISTORY: Trauma.    COMPARISON: None.      Impression     IMPRESSION:     Left clavicle: No acute bony abnormality. Mild hypertrophic  degenerative change at the acromioclavicular joint.    Left shoulder: No acute or significant chronic bony or soft tissue  abnormality.   XR Clavicle Left 2 Views    Narrative    LEFT SHOULDER THREE OR MORE VIEWS, LEFT CLAVICLE TWO VIEWS 9/30/2019  12:49 PM     HISTORY: Trauma.    COMPARISON: None.      Impression    IMPRESSION:     Left clavicle: No acute bony abnormality. Mild hypertrophic  degenerative change at the acromioclavicular joint.    Left shoulder: No acute or significant chronic bony or soft tissue  abnormality.       Medications - No data to display    Assessments & Plan (with Medical Decision Making)  48-year-old male with fall from deer stand yesterday.  Fortunately no signs of serious injury.  There is some pain and soft tissue swelling to superior to the left medial clavicular region.  X-rays to the clavicle and shoulder are negative.  Lung appears fully inflated and no dyspnea.  He is given a sling to use as needed.  Recommended some ice, rest, Tylenol and ibuprofen as needed.  Follow-up with primary care if not improving over the next week.  Return anytime sooner if condition worsens or any other concern.     I have reviewed the nursing notes.    I have reviewed the findings, diagnosis, plan and need for follow up with the patient.       New Prescriptions    No medications on file       Final diagnoses:   Fall, initial encounter   Strain of left shoulder, initial encounter     This document serves as a record of services personally performed by Adilson Moore MD. It was created on their behalf by Dana Vazquez, a trained medical scribe. The creation of this record is based on the provider's personal observations and the statements of the patient. This document has been checked and approved by the attending provider.    Note: Chart documentation done in part with Dragon Voice Recognition software. Although reviewed after  completion, some word and grammatical errors may remain.    9/30/2019   [facility] EMERGENCY DEPARTMENT     Adilson Moore MD  09/30/19 1487

## 2019-09-30 NOTE — ED AVS SNAPSHOT
[facility] Emergency Department  [address] [place] [address]  Phone:  [phone]  Fax:  [phone]                                    Raymond Sue   MRN: [id number]    Department:  [facility] Emergency Department   Date of Visit:  9/30/2019           After Visit Summary Signature Page    I have received my discharge instructions, and my questions have been answered. I have discussed any challenges I see with this plan with the nurse or doctor.    ..........................................................................................................................................  Patient/Patient Representative Signature      ..........................................................................................................................................  Patient Representative Print Name and Relationship to Patient    ..................................................               ................................................  Date                                   Time    ..........................................................................................................................................  Reviewed by Signature/Title    ...................................................              ..............................................  Date                                               Time          22EPIC Rev 08/18

## 2019-09-30 NOTE — DISCHARGE INSTRUCTIONS
Ice the area as needed.  Rest and use sling as needed for the next 2-5 days.    Tylenol or ibuprofen okay for pain.